# Patient Record
Sex: FEMALE | Race: WHITE | NOT HISPANIC OR LATINO | Employment: PART TIME | URBAN - METROPOLITAN AREA
[De-identification: names, ages, dates, MRNs, and addresses within clinical notes are randomized per-mention and may not be internally consistent; named-entity substitution may affect disease eponyms.]

---

## 2018-01-02 ENCOUNTER — OFFICE VISIT (OUTPATIENT)
Dept: VASCULAR SURGERY | Facility: CLINIC | Age: 63
End: 2018-01-02
Payer: COMMERCIAL

## 2018-01-02 DIAGNOSIS — Z53.9 ERRONEOUS ENCOUNTER--DISREGARD: Primary | ICD-10-CM

## 2018-01-02 PROCEDURE — 99243 OFF/OP CNSLTJ NEW/EST LOW 30: CPT | Performed by: SURGERY

## 2018-01-02 NOTE — MR AVS SNAPSHOT
"              After Visit Summary   2018    Violetta Chavez    MRN: [id number]           Patient Information     Date Of Birth          1955        Visit Information        Provider Department      2018 2:45 PM Denilson Marie [facility]      Today's Diagnoses     ERRONEOUS ENCOUNTER--DISREGARD    -  1       Follow-ups after your visit        Who to contact     If you have questions or need follow up information about today's clinic visit or your schedule please contact [facility] directly at [phone].  Normal or non-critical lab and imaging results will be communicated to you by Tensilicahart, letter or phone within 4 business days after the clinic has received the results. If you do not hear from us within 7 days, please contact the clinic through Tensilicahart or phone. If you have a critical or abnormal lab result, we will notify you by phone as soon as possible.  Submit refill requests through [employer] or call your pharmacy and they will forward the refill request to us. Please allow 3 business days for your refill to be completed.          Additional Information About Your Visit        MyChart Information     [employer] lets you send messages to your doctor, view your test results, renew your prescriptions, schedule appointments and more. To sign up, go to [email or web address] . Click on \"Log in\" on the left side of the screen, which will take you to the Welcome page. Then click on \"Sign up Now\" on the right side of the page.     You will be asked to enter the access code listed below, as well as some personal information. Please follow the directions to create your username and password.     Your access code is: [email or web address]  Expires: 2018  2:36 PM     Your access code will  in 90 days. If you need help or a new code, please call your [place] clinic or [phone].        Care EveryWhere ID     " This is your Care EveryWhere ID. This could be used by other organizations to access your [place] medical records  [id number]         Blood Pressure from Last 3 Encounters:   12/19/11 149/94    Weight from Last 3 Encounters:   12/19/11 68 kg (150 lb)              Today, you had the following     No orders found for display       Primary Care Provider Office Phone # Fax #    Rajiv Camila Faust [phone]       [facility]   [facility] [address]        Equal Access to Services     [facility] Center[facility] : Hadii aad ku hadasho Soomaali, waaxda luqadaha, qaybta kaalmada adeegyada, waxperla denisein hayduyn adeconstantin simpson . So [facility] [phone].    ATENCIÓN: Si habla español, tiene a fernando disposición servicios gratuitos de asistencia lingüística. [facility] [phone].    We comply with applicable federal civil rights laws and [place] laws. We do not discriminate on the basis of race, color, national origin, age, disability, sex, sexual orientation, or gender identity.            Thank you!     Thank you for choosing [facility]  for your care. Our goal is always to provide you with excellent care. Hearing back from our patients is one way we can continue to improve our services. Please take a few minutes to complete the written survey that you may receive in the mail after your visit with us. Thank you!             Your Updated Medication List - Protect others around you: Learn how to safely use, store and throw away your medicines at www.disposemymeds.org.          This list is accurate as of 1/2/18 11:59 PM.  Always use your most recent med list.                   Brand Name Dispense Instructions for use Diagnosis    * acetaminophen 500 MG tablet    TYLENOL    1 tablet    Take 1-2 tablets by mouth 3 times daily.        * acetaminophen 500 MG tablet    TYLENOL     Take 1-2 tablets by mouth every 6 hours as needed.        * ibuprofen 200 MG tablet    ADVIL/MOTRIN    1  tablet    Take 3 tablets by mouth 4 times daily.        * ibuprofen 200 MG capsule      Take 400 mg by mouth every 4 hours as needed.        oxyCODONE IR 5 MG tablet    ROXICODONE    30 tablet    Take  by mouth every 6 hours as needed for pain for 20 doses. Take 1-2 tablets every 6 hours for severe pain.        VICODIN PO      Take  by mouth.        * Notice:  This list has 4 medication(s) that are the same as other medications prescribed for you. Read the directions carefully, and ask your doctor or other care provider to review them with you.

## 2018-01-03 NOTE — PROGRESS NOTES
[facility] Consult[facility]    Violetta Chavez is a 62-year-old female who presents with complaints of bilateral leg pain and varicose veins with a complication of superficial thrombophlebitis of her left leg on December 30, 2017.  She apparently has had a ultrasound of her left lower extremity veins that have confirmed the superficial phlebitis.  She states she had an episode of erythema and tenderness in the recent past just cephalad of the current episode which has slowly improved.    Varicose veins have been present on her legs for some 15 years and have gradually increased in size.  She also complains of chronic tenderness in her lower legs, especially around her ankles.    She has used Aleve to relieve her pain but has not worn support hose to any significant extent.  She has no history of trauma to her legs.    Past medical history  Medical: Hypertension and scoliosis  Surgical: L4 and L5 disc surgery 2003 and umbilical hernia repair    Medicines: Atorvastatin and triamterene    Allergies: NKA    Social history: She is a realtor, is a non-smoker and has less than 1 drink of alcohol per day.  She is the mother of 3 children    12 point review of systems was completed and was reviewed and is negative other than the previously mentioned problems.    Physical exam  General: Pleasant, fit appearing female in no acute distress.  She is 5 feet 7 inches tall  HEENT: Normocephalic, atraumatic.  EOMI.  External ears and nose are normal.  Respiratory: Normal respiratory effort  Cardiovascular: Pulses regular  Musculoskeletal: Gait and station are normal.  The joints of her fingers and toes are without deformity.  Psychiatric: Judgment, insight, mood and affect are normal  Neurologic: Grossly normal  Extremities: She has a few scattered 4-6 mm varicosities on the right medial thigh and leg.  There are no venous stasis changes there is trace edema.  In the left leg, she has 4-6 mm varicosities coursing from the distal  lateral leg down to the left lateral ankle.  There are other scattered 4-6 mm varicosities about the left medial leg.  There is erythema and induration of the distal medial left leg in 2 areas.  The more cephalad area is round and disklike, consistent with lipoma myelosclerosis.  The more distal area is more tubular, erythematous and indurated.  This is consistent with possible superficial thrombophlebitis.There is trace edema.  She has easily palpable dorsalis pedis and posterior tibial pulses bilaterally.    Impression  By history she has superficial thrombophlebitis of her left lower extremity but, on physical exam, this appears consistent with lipodermatosclerosis.  I would recommend that she begin wearing compression hose on a more consistent basis, elevate her legs when possible and that she return for duplex ultrasound of her lower extremity veins to assess for underlying valve incompetence.  She may well have an incompetent  of the left lower extremity that is contributing to the process.    She appeared to understand the discussion and agreed with the plan.    TERRENCE Marie MD

## 2018-02-06 ENCOUNTER — APPOINTMENT (OUTPATIENT)
Dept: VASCULAR SURGERY | Facility: CLINIC | Age: 63
End: 2018-02-06
Payer: COMMERCIAL

## 2018-02-06 ENCOUNTER — OFFICE VISIT (OUTPATIENT)
Dept: VASCULAR SURGERY | Facility: CLINIC | Age: 63
End: 2018-02-06
Payer: COMMERCIAL

## 2018-02-06 DIAGNOSIS — Z53.9 ERRONEOUS ENCOUNTER--DISREGARD: Primary | ICD-10-CM

## 2018-02-06 PROCEDURE — 99213 OFFICE O/P EST LOW 20 MIN: CPT | Performed by: SURGERY

## 2018-02-06 PROCEDURE — 93970 EXTREMITY STUDY: CPT | Performed by: SURGERY

## 2018-02-06 NOTE — MR AVS SNAPSHOT
"              After Visit Summary   2018    Violetta Chavez    MRN: [id number]           Patient Information     Date Of Birth          1955        Visit Information        Provider Department      2018 2:30 PM [facility]ilson [facility]      Today's Diagnoses     ERRONEOUS ENCOUNTER--DISREGARD    -  1       Follow-ups after your visit        Who to contact     If you have questions or need follow up information about today's clinic visit or your schedule please contact [facility] directly at [phone].  Normal or non-critical lab and imaging results will be communicated to you by [employer]art, letter or phone within 4 business days after the clinic has received the results. If you do not hear from us within 7 days, please contact the clinic through [employer] or phone. If you have a critical or abnormal lab result, we will notify you by phone as soon as possible.  Submit refill requests through [employer] or call your pharmacy and they will forward the refill request to us. Please allow 3 business days for your refill to be completed.          Additional Information About Your Visit        MyChart Information     [employer] lets you send messages to your doctor, view your test results, renew your prescriptions, schedule appointments and more. To sign up, go to [email or web address] . Click on \"Log in\" on the left side of the screen, which will take you to the Welcome page. Then click on \"Sign up Now\" on the right side of the page.     You will be asked to enter the access code listed below, as well as some personal information. Please follow the directions to create your username and password.     Your access code is: [email or web address]  Expires: 2018  2:36 PM     Your access code will  in 90 days. If you need help or a new code, please call your [place] clinic or [phone].        Care EveryWhere ID     " This is your Care EveryWhere ID. This could be used by other organizations to access your [place] medical records  [id number]         Blood Pressure from Last 3 Encounters:   12/19/11 149/94    Weight from Last 3 Encounters:   12/19/11 68 kg (150 lb)              Today, you had the following     No orders found for display       Primary Care Provider Office Phone # Fax #    Rajiv Camila Faust [phone]       [facility] [address]  [facility] [address]        Equal Access to Services     [facility] : Hadii aad ku hadasho Soomaali, waaxda luqadaha, qaybta kaalmada adeegyada, waxperla denisein hayduyn adeconstantin simpson . So [facility] [phone].    ATENCIÓN: Si habla español, tiene a fernando disposición servicios gratuitos de asistencia lingüística. [facility] [phone].    We comply with applicable federal civil rights laws and [place] laws. We do not discriminate on the basis of race, color, national origin, age, disability, sex, sexual orientation, or gender identity.            Thank you!     Thank you for choosing [facility]  for your care. Our goal is always to provide you with excellent care. Hearing back from our patients is one way we can continue to improve our services. Please take a few minutes to complete the written survey that you may receive in the mail after your visit with us. Thank you!             Your Updated Medication List - Protect others around you: Learn how to safely use, store and throw away your medicines at www.disposemymeds.org.          This list is accurate as of 2/6/18 11:59 PM.  Always use your most recent med list.                   Brand Name Dispense Instructions for use Diagnosis    * acetaminophen 500 MG tablet    TYLENOL    1 tablet    Take 1-2 tablets by mouth 3 times daily.        * acetaminophen 500 MG tablet    TYLENOL     Take 1-2 tablets by mouth every 6 hours as needed.        * ibuprofen 200 MG tablet    ADVIL/MOTRIN    1  tablet    Take 3 tablets by mouth 4 times daily.        * ibuprofen 200 MG capsule      Take 400 mg by mouth every 4 hours as needed.        oxyCODONE IR 5 MG tablet    ROXICODONE    30 tablet    Take  by mouth every 6 hours as needed for pain for 20 doses. Take 1-2 tablets every 6 hours for severe pain.        VICODIN PO      Take  by mouth.        * Notice:  This list has 4 medication(s) that are the same as other medications prescribed for you. Read the directions carefully, and ask your doctor or other care provider to review them with you.

## 2018-02-12 NOTE — PROGRESS NOTES
[facility]    Violetta Chavez returns in follow-up of bilateral leg pain and varicose veins.  She has had complications of superficial thrombophlebitis of her left lower extremity in the past.  She returned today for duplex ultrasound of her lower extremity veins.    Duplex ultrasound of her right lower extremity deep veins revealed no evidence of deep vein thrombosis or deep vein valve incompetence.  Her right great saphenous vein is incompetent from the proximal thigh through the mid calf and is the source of a 4 mm incompetent vein branch.  The small saphenous vein is competent.  The anterior accessory saphenous vein is not visualized.  There are no incompetent perforators appreciated.  In the left lower extremity is no evidence of deep vein thrombosis or deep vein valve incompetence.  Her left great saphenous vein is incompetent from the saphenofemoral junction to the ankle with incompetent varicosities coursing from the thigh down to the calf.  The left small saphenous vein is incompetent from the distal calf.  The Vein of Giacomini is competent.  Anterior accessory saphenous vein is not visualized.  There are no incompetent perforators appreciated.    Impression  CEAP 3/4 chronic venous insufficiency left lower extremity with CEAP to chronic venous insufficiency right lower extremity secondary to bilateral great saphenous vein incompetence.  We discussed options of continued conservative management with the use of compression hose with small risk of superficial thumb phlebitis, bleeding or worsening of varicose veins.  She is already suffered a complication of superficial phlebitis and certainly is at increased risk for this.    If she chose treatment, she would be a candidate for endovenous ablation of the left great saphenous vein with and without phlebectomies.  Details of radiofrequency ablation and cyanoacrylate glue ablation of the saphenous vein including risks and benefits of each were  discussed.  After discussion, she wishes to have the VenaSeal procedure over radiofrequency ablation.  Details of the procedure including risks of bleeding, infection and the low risk of thrombus extension to the common femoral vein were discussed.  She appears to understand.  She understands that she will likely have residual varicosities if she does not have phlebectomy.  Because she has had pretty superficial phlebitis, her phlebectomies will be considered medically necessary.    She will think about her options and let us know how she like to proceed.    TERRENCE Marie MD

## 2022-09-13 ENCOUNTER — TELEPHONE (OUTPATIENT)
Dept: VASCULAR SURGERY | Facility: CLINIC | Age: 67
End: 2022-09-13

## 2022-09-13 NOTE — TELEPHONE ENCOUNTER
A faxed referral came through for patient to see Dr. Marie. Called and left voicemail message for patient to call back if she is interested in an appointment. She has seen Dr. Marie in the past; 2/6/18 for ultrasound results.

## 2022-09-16 DIAGNOSIS — I83.813 VARICOSE VEINS OF BILATERAL LOWER EXTREMITIES WITH PAIN: Primary | ICD-10-CM

## 2022-10-18 ENCOUNTER — ANCILLARY PROCEDURE (OUTPATIENT)
Dept: ULTRASOUND IMAGING | Facility: CLINIC | Age: 67
End: 2022-10-18
Attending: SURGERY
Payer: COMMERCIAL

## 2022-10-18 DIAGNOSIS — I83.813 VARICOSE VEINS OF BILATERAL LOWER EXTREMITIES WITH PAIN: ICD-10-CM

## 2022-10-18 PROCEDURE — 93970 EXTREMITY STUDY: CPT | Performed by: SURGERY

## 2022-11-29 ENCOUNTER — TELEPHONE (OUTPATIENT)
Dept: VASCULAR SURGERY | Facility: CLINIC | Age: 67
End: 2022-11-29

## 2022-11-29 ENCOUNTER — OFFICE VISIT (OUTPATIENT)
Dept: VASCULAR SURGERY | Facility: CLINIC | Age: 67
End: 2022-11-29
Payer: COMMERCIAL

## 2022-11-29 DIAGNOSIS — I83.813 VARICOSE VEINS OF BILATERAL LOWER EXTREMITIES WITH PAIN: Primary | ICD-10-CM

## 2022-11-29 PROCEDURE — 99214 OFFICE O/P EST MOD 30 MIN: CPT | Performed by: SURGERY

## 2022-11-29 RX ORDER — ATORVASTATIN CALCIUM 10 MG/1
TABLET, FILM COATED ORAL
COMMUNITY
Start: 2022-09-14

## 2022-11-29 RX ORDER — TRIAMTERENE AND HYDROCHLOROTHIAZIDE 37.5; 25 MG/1; MG/1
CAPSULE ORAL
COMMUNITY
Start: 2022-09-14

## 2022-11-29 NOTE — PATIENT INSTRUCTIONS
Varicose Veins and Spider Veins    Varicose veins are swollen, enlarged veins most often found in the legs. They are usually blue or purple in color and may bulge, twist, and stand out under the skin. Spider veins are small veins just under your skin that can look red, blue or purple.        Normally, veins return blood from the body to the heart. The leg veins have one-way valves that prevent blood from flowing backward in the vein. When the valves are weak or damaged, blood backs up in the veins. This may cause some of the veins to swell and bulge and become varicose veins.     Symptoms  Varicose veins may or may not cause symptoms. If symptoms do occur, they can include:  Legs that feel tired, achy, heavy, or itchy  Leg swelling  Leg muscle cramps  Skin changes, such as discoloration, dryness, redness, or rash (in more severe cases, you may also have sores on the skin called venous leg ulcers)    Risk factors  There are a number of factors that increase the risk for varicose veins. These can include:   Being a woman  Being older  Sitting or standing for long periods  Being overweight  Being pregnant  Having a family history of varicose veins  Hormones, birth control pills    Treatment starts with simple self-help measures (see below). If these don't help, there are many procedures that can be done to shrink or remove varicose veins. Your healthcare provider can tell you more about these options, if needed.     Home care  Support or compression stockings will likely be prescribed. If so, be sure to wear them as directed. They may help improve blood flow.  Exercising helps strengthen your leg muscles and improve blood flow. To get the most benefit, choose exercises such as walking, swimming, or cycling. Also try to exercise for at least 30 minutes on most days.  Raising your legs above heart level will help relieve swelling and keep blood from pooling in veins. Try to elevate your legs for 15 to 20 minutes at  the end of the day, and whenever you're relaxing. To make sure your legs are raised above heart level, prop them up on cushions or large pillows.  To keep blood moving when you have to sit or stand for long periods, try these tips:  At work, take walking breaks instead of coffee breaks. Walk during your lunch hour. Or try flexing your feet up and down 10 times each hour.  When standing, raise yourself up and down on your toes, or rock back and forth on your heels.  If you are overweight, talk with your healthcare provider about setting up a weight-loss plan. Maintaining a healthy weight can help reduce the strain on your veins. It may also improve symptoms, such as swelling and aching.  If you have dryness and itching, ask your provider about special lotions that can be applied to the skin to help improve symptoms.     Follow-up care  Follow up with your healthcare provider, or as directed. If imaging tests were done, you'll be told the results and if there are any new findings that affect your care.     When to seek medical advice  Call your healthcare provider right away if any of these occur:  Sudden, severe leg swelling, pain, or redness  Symptoms worsen, or they don't improve with self-care  Bleeding from any affected veins  Ulcers form on the legs, ankles, or feet  Fever of 100.4 F (38 C) or higher, or as advised by your provider    [employer] last reviewed this educational content on 4/1/2018 2000-2021 [employer]. All rights reserved. This information is not intended as a substitute for professional medical care. Always follow your healthcare professional's instructions.    Self-Care for Spider and Varicose Veins    Your healthcare provider may suggest that you try self-care. Exercising and maintaining a healthy weight may keep problem veins from getting worse. Wearing elastic stockings and elevating your legs can help improve blood flow. Taking breaks when you sit or stand helps,  too.        Wearing compression stockings  Compression stockings gently squeeze veins so blood flows upward. If you need compression stockings, your healthcare provider can prescribe them for you. Follow your healthcare provider's advice about how and when to wear them. Compression stockings come in several different levels of pressure. Ask your healthcare provider which level of pressure would help you the most.     Raising your legs above heart level will help relieve swelling and keep blood from pooling in veins. Try to elevate your legs for 15 to 20 minutes at the end of the day, and whenever you're relaxing. To make sure your legs are raised above heart level, prop them up on cushions or large pillows.    To keep blood moving when you have to sit or stand for long periods, try these tips:  At work, take walking breaks instead of coffee breaks. Walk during your lunch hour. Or try flexing your feet up and down 10 times each hour.  When standing, raise yourself up and down on your toes, or rock back and forth on your heels.    [employer] last reviewed this educational content on 11/1/2019 2000-2021 [employer]. All rights reserved. This information is not intended as a substitute for professional medical care. Always follow your healthcare professional's instructions.    Treatment Options    Sclerotherapy  Your healthcare provider will inject the vein with a special chemical that will quickly close the vein from the inside. This is particularly useful for spider veins and smaller varicose veins.      If you have large varicose veins, surgery may be the best choice. But it will not prevent new varicose veins from forming. Surgery is most often done in a surgery center or in the office.    If surgery is recommended for you, your surgery will be tailored to your needs. Varicose veins may be tied off (ligation), destroyed, or removed. Blood will then flow through the healthy veins. One or more of the  following techniques may be used:    Ablation (laser or radiofrequency)  A tiny cut in the skin is made near the varicose vein. A small tube called a catheter is inserted into the vein. Energy or heat released from the catheter tip will make the vein walls collapse and stick together, stopping all blood flow through the vein.         Ablation (glue)  A tiny cut in the skin is made near the varicose vein. A small tube called a catheter is inserted into the vein. Droplets of glue are deposited into the vein to make vein walls collapse and stick together stopping blood flow through the vein.    Microphlebectomy or ambulatory phlebectomy  A special hook is used to gently take out a varicose vein through tiny incisions. Microphlebectomy may be done in your healthcare provider's office.      Vein stripping and ligation (rare)  In more severe cases, the surgeon may tie off and remove veins by making smaller cuts in the skin. Smaller branching veins may also be tied off or removed.    Know about the risks  Your healthcare provider will talk with you about the risks of surgery. These include:  Bleeding or swelling  A sense of numbness, burning, or tingling in areas near the procedure  Edema or swelling in the legs  Clots in the deep veins that may travel to the lungs  Infection  Scarring  Inflammation related to the glue    [employer] last reviewed this educational content on 11/1/2019 (Sclerotherapy image) 12/1/2019 (Radiofrequency ablation image, Microphlebectomy image)    9416-1630 [employer]. All rights reserved. This information is not intended as a substitute for professional medical care. Always follow your healthcare professional's instructions.

## 2022-11-29 NOTE — LETTER
11/29/2022         RE: Violetta Chavez  [address] [place] [address]        Dear Colleague,    Thank you for referring your patient, Violetta Chavez, to the [facility] [place]. Please see a copy of my visit note below.    [facility] [place] Progress Note    Violetta Chavez presents in follow-up of bilateral lower extremity varicose veins with pain.  I saw her last in February 2018 at which time we performed a bilateral lower extremity venous competency study which revealed bilateral great saphenous vein incompetence with varicose veins coursing from the great saphenous veins bilaterally.    She chose to pursue conservative measures at that time and is noted significant worsening of the varicosities, especially on her right lower extremity.  By history, she has had superficial thrombophlebitis of her left lower extremity in the past.  She has not suffered any additional complications since I last saw her.    Symptoms she describes is primarily fatigue which is worse toward the end of the day and improves with elevation of leg and Tylenol or ibuprofen on an as-needed basis.    She has no history of deep vein thrombosis or hemorrhage but, as above, has had superficial thrombophlebitis of her left lower extremity.    Her family history is negative for varicose veins.    Physical Exam  General: Pleasant female in no acute distress.  Blood pressure 152/91, pulse 73  Extremities: Right lower extremity: 6 mm varicosities coursing from the distal medial thigh, down the medial calf and to the distal third of the medial leg.  Trace edema but no stasis changes.    Left lower extremity: 4 mm varicosity on the left medial calf.  There is a 4 mm varicosity coursing from distal third of the lateral left leg down to the lateral ankle.  Trace edema but no stasis hyperpigmentation.    Ultrasound:  Name:  Violetta PAINTING  Aagaard                                                      Patient ID: [id number]  Date: 2022                                                          : 1955  Sex: female                                                                             Examined by: ROCIO Uriarte RVT  Age:  67 year old                                                                     Reading MD: GIACOMO     INDICATION:  Varicose veins of bilateral lower extremities with pain.     EXAM TYPE  BILATERAL LOWER EXTREMITY VENOUS DUPLEX FOR VENOUS INSUFFICIENCY  TECHNICAL SUMMARY     A duplex ultrasound study using color flow was performed to evaluate the bilateral lower extremity veins for valvular incompetence with the patient in a steep reversed trendelenberg.      RIGHT:     The deep veins demonstrate phasic flow, compress, and respond to augmentations.  There is no reflux or DVT.  The common femoral and mid femoral veins are incompetent and free of thrombus. The remaining deep veins are competent and free of thrombus.      The GSV demonstrates phasic flow, compresses, and responds to augmentations from the saphenofemoral junction to the ankle with no evidence of thrombus. The greater saphenous vein measures 5.6 mm at the saphenofemoral junction, 5.2 mm in the proximal thigh, and 2.0 mm at the knee. The GSV is incompetent from the SFJ to the proximal calf with the greatest reflux time of 9123 milliseconds.The GSV gives rise to multiple incompetent varicose veins, the largest measures 5.1 mm off the mid thigh that courses medially to with a reflux time of 2409 milliseconds.      The AASV is competent ( 1.7 mm) draining into the saphenofemoral junction.      The Giacomini vein is competent ( 0.8 mm) communicating with the small saphenous vein at the knee level.      The SSV demonstrates phasic flow, compresses, and responds to augmentations from the popliteal space to the ankle.  No reflux or thrombus is seen. The  saphenopopliteal junction is absent.      Perforators: There is an incompetent  vein ( 7.5 mm) at 5 cm from medial malleolus that communicates with a varicose vein measuring 4.9 mm.      LEFT:     The deep veins demonstrate phasic flow, compress, and respond to augmentations.  There is no evidence of DVT.  The proximal femoral and popliteal veins are incompetent and free of thrombus. The remaining deep veins are competent and free of thrombus.      The GSV demonstrates phasic flow, compresses, and responds to augmentations from the saphenofemoral junction to the ankle with no evidence of thrombus. The greater saphenous vein measures 6.8 mm at the saphenofemoral junction, 6.1 mm in the proximal thigh, and 4.6 mm at the knee.  The GSV is incompetent from the proximal thigh to the distal calf with the greatest reflux time of 10,310 milliseconds. The GSV gives rise to multiple incompetent varicose veins, the largest measures 4.5 mm off the proximal calf that courses medially with a reflux time of 7160 milliseconds.      The AASV is not visualized.      The Giacomini vein is competent ( 0.8 mm) communicating with the small saphenous vein at the knee level.      The SSV demonstrates phasic flow, compresses, and responds to augmentations from the popliteal space to the ankle.  No thrombus is seen. The saphenopopliteal junction is absent. The SSV is incompetent in the distal calf with a reflux time of 1237 milliseconds.       Perforators:  There is an incompetent  vein ( 2.8 mm) at 15 cm from medial malleolus that communicates with a varicose vein measuring 3.2 mm that courses to the lateral ankle.        FINAL SUMMARY:     Incompetence Criteria:     1.  No evidence of deep or superficial vein thrombosis in either lower extremity  2.  Right common femoral and mid femoral vein incompetence  3.  Left proximal femoral and popliteal vein incompetence  4.  Right great saphenous vein incompetence, the source  of incompetent varicosities  5.  Incompetent  distal medial right leg, the source of incompetent varicosities  6.  Left great saphenous vein incompetence, the source of incompetent varicosities  7.  Left distal calf small saphenous vein incompetence  8.  Incompetent  lateral left leg     Greater than 500 milliseconds of reflux time measured in the superficial and perforating veins and greater than 1000 milliseconds of reflux time measured in the deep veins.     TEJA Marie MD, FACS    Assessment:  Progression of bilateral lower extremity chronic venous insufficiency which is now worse/more symptomatic on her right leg than her left.  She has not pursued an adequate course of conservative management with compression hose, therefore we measured and fitted her with knee-high 20-30 mmHg compression.  Exercise, leg elevation and dietary measures were also discussed with her.    The lower extremity venous anatomy, the pathophysiology of venous insufficiency and the option of continued conservative management was discussed.  She is quite concerned about the progression of the disease process since I saw her in 2018, especially on her right lower extremity.    Based on ultrasound findings, the source of her symptoms appear to be from the great saphenous veins bilaterally.  She also has an incompetent perforating vein in the distal lateral left leg which is the source of the varicosity coursing down to the left lateral ankle as above.    We discussed options for treating superficial venous insufficiency in the office setting utilizing endovenous ablation with a thermal or nonthermal techniques.  Benefits and risks of each were discussed.  Management of her branch tributaries could be with either concomitant phlebectomies or delayed sclerotherapy.  She would defer concomitant phlebectomies if treatment is necessary.  She voiced understanding of our discussion and her questions were  answered.    Plan:  Pursue an appropriate course of conservative management with follow-up.  Follow-up could be via a video visit.    Denilson Marie MD    Dictated using Dragon voice recognition software which may result in transcription errors          Again, thank you for allowing me to participate in the care of your patient.        Sincerely,        Denilson Marie MD

## 2022-11-29 NOTE — NURSING NOTE
Patient Reported symptoms:    Bilateral leg   Heaviness A good bit of the time   Achiness Some of the time   Swelling A little of the time   Throbbing None of the time   Itching None of the time   Appearance Moderately noticeable   Impact on work/activities Symptoms but full able to participate

## 2022-11-29 NOTE — PROGRESS NOTES
[facility] [place] Progress Note    Violetta Chavez presents in follow-up of bilateral lower extremity varicose veins with pain.  I saw her last in 2018 at which time we performed a bilateral lower extremity venous competency study which revealed bilateral great saphenous vein incompetence with varicose veins coursing from the great saphenous veins bilaterally.    She chose to pursue conservative measures at that time and is noted significant worsening of the varicosities, especially on her right lower extremity.  By history, she has had superficial thrombophlebitis of her left lower extremity in the past.  She has not suffered any additional complications since I last saw her.    Symptoms she describes is primarily fatigue which is worse toward the end of the day and improves with elevation of leg and Tylenol or ibuprofen on an as-needed basis.    She has no history of deep vein thrombosis or hemorrhage but, as above, has had superficial thrombophlebitis of her left lower extremity.    Her family history is negative for varicose veins.    Physical Exam  General: Pleasant female in no acute distress.  Blood pressure 152/91, pulse 73  Extremities: Right lower extremity: 6 mm varicosities coursing from the distal medial thigh, down the medial calf and to the distal third of the medial leg.  Trace edema but no stasis changes.    Left lower extremity: 4 mm varicosity on the left medial calf.  There is a 4 mm varicosity coursing from distal third of the lateral left leg down to the lateral ankle.  Trace edema but no stasis hyperpigmentation.    Ultrasound:  Name:  Violetta Chavez                                                      Patient ID: [id number]  Date: 2022                                                          : 1955  Sex: female                                                                             Examined by: ROCIO Uriarte RVT  Age:  67 year  old                                                                     Reading MD: GIACOMO     INDICATION:  Varicose veins of bilateral lower extremities with pain.     EXAM TYPE  BILATERAL LOWER EXTREMITY VENOUS DUPLEX FOR VENOUS INSUFFICIENCY  TECHNICAL SUMMARY     A duplex ultrasound study using color flow was performed to evaluate the bilateral lower extremity veins for valvular incompetence with the patient in a steep reversed trendelenberg.      RIGHT:     The deep veins demonstrate phasic flow, compress, and respond to augmentations.  There is no reflux or DVT.  The common femoral and mid femoral veins are incompetent and free of thrombus. The remaining deep veins are competent and free of thrombus.      The GSV demonstrates phasic flow, compresses, and responds to augmentations from the saphenofemoral junction to the ankle with no evidence of thrombus. The greater saphenous vein measures 5.6 mm at the saphenofemoral junction, 5.2 mm in the proximal thigh, and 2.0 mm at the knee. The GSV is incompetent from the SFJ to the proximal calf with the greatest reflux time of 9123 milliseconds.The GSV gives rise to multiple incompetent varicose veins, the largest measures 5.1 mm off the mid thigh that courses medially to with a reflux time of 2409 milliseconds.      The AASV is competent ( 1.7 mm) draining into the saphenofemoral junction.      The Giacomini vein is competent ( 0.8 mm) communicating with the small saphenous vein at the knee level.      The SSV demonstrates phasic flow, compresses, and responds to augmentations from the popliteal space to the ankle.  No reflux or thrombus is seen. The saphenopopliteal junction is absent.      Perforators: There is an incompetent  vein ( 7.5 mm) at 5 cm from medial malleolus that communicates with a varicose vein measuring 4.9 mm.      LEFT:     The deep veins demonstrate phasic flow, compress, and respond to augmentations.  There is no evidence of DVT.  The  proximal femoral and popliteal veins are incompetent and free of thrombus. The remaining deep veins are competent and free of thrombus.      The GSV demonstrates phasic flow, compresses, and responds to augmentations from the saphenofemoral junction to the ankle with no evidence of thrombus. The greater saphenous vein measures 6.8 mm at the saphenofemoral junction, 6.1 mm in the proximal thigh, and 4.6 mm at the knee.  The GSV is incompetent from the proximal thigh to the distal calf with the greatest reflux time of 10,310 milliseconds. The GSV gives rise to multiple incompetent varicose veins, the largest measures 4.5 mm off the proximal calf that courses medially with a reflux time of 7160 milliseconds.      The AASV is not visualized.      The Giacomini vein is competent ( 0.8 mm) communicating with the small saphenous vein at the knee level.      The SSV demonstrates phasic flow, compresses, and responds to augmentations from the popliteal space to the ankle.  No thrombus is seen. The saphenopopliteal junction is absent. The SSV is incompetent in the distal calf with a reflux time of 1237 milliseconds.       Perforators:  There is an incompetent  vein ( 2.8 mm) at 15 cm from medial malleolus that communicates with a varicose vein measuring 3.2 mm that courses to the lateral ankle.        FINAL SUMMARY:     Incompetence Criteria:     1.  No evidence of deep or superficial vein thrombosis in either lower extremity  2.  Right common femoral and mid femoral vein incompetence  3.  Left proximal femoral and popliteal vein incompetence  4.  Right great saphenous vein incompetence, the source of incompetent varicosities  5.  Incompetent  distal medial right leg, the source of incompetent varicosities  6.  Left great saphenous vein incompetence, the source of incompetent varicosities  7.  Left distal calf small saphenous vein incompetence  8.  Incompetent  lateral left leg     Greater than  500 milliseconds of reflux time measured in the superficial and perforating veins and greater than 1000 milliseconds of reflux time measured in the deep veins.     TEJA Marie MD, FACS    Assessment:  Progression of bilateral lower extremity chronic venous insufficiency which is now worse/more symptomatic on her right leg than her left.  She has not pursued an adequate course of conservative management with compression hose, therefore we measured and fitted her with knee-high 20-30 mmHg compression.  Exercise, leg elevation and dietary measures were also discussed with her.    The lower extremity venous anatomy, the pathophysiology of venous insufficiency and the option of continued conservative management was discussed.  She is quite concerned about the progression of the disease process since I saw her in 2018, especially on her right lower extremity.    Based on ultrasound findings, the source of her symptoms appear to be from the great saphenous veins bilaterally.  She also has an incompetent perforating vein in the distal lateral left leg which is the source of the varicosity coursing down to the left lateral ankle as above.    We discussed options for treating superficial venous insufficiency in the office setting utilizing endovenous ablation with a thermal or nonthermal techniques.  Benefits and risks of each were discussed.  Management of her branch tributaries could be with either concomitant phlebectomies or delayed sclerotherapy.  She would prefer concomitant phlebectomies if treatment is necessary.  She voiced understanding of our discussion and her questions were answered.    Plan:  Pursue an appropriate course of conservative management with follow-up.  Follow-up could be via a video visit.    Denilson Marie MD    Dictated using Dragon voice recognition software which may result in transcription errors

## 2023-02-28 ENCOUNTER — VIRTUAL VISIT (OUTPATIENT)
Dept: VASCULAR SURGERY | Facility: CLINIC | Age: 68
End: 2023-02-28
Payer: COMMERCIAL

## 2023-02-28 DIAGNOSIS — I83.813 VARICOSE VEINS OF BILATERAL LOWER EXTREMITIES WITH PAIN: Primary | ICD-10-CM

## 2023-02-28 PROCEDURE — 99213 OFFICE O/P EST LOW 20 MIN: CPT | Mod: VID | Performed by: SURGERY

## 2023-02-28 RX ORDER — MULTIVIT WITH MINERALS/LUTEIN
1000 TABLET ORAL 2 TIMES DAILY
COMMUNITY

## 2023-02-28 NOTE — LETTER
2023         RE: Violetta Chavez  [address] [place] [address]        Dear Colleague,    Thank you for referring your patient, Violetta Chavez, to the [facility] [place]. Please see a copy of my visit note below.    Violetta is a 67 year old who is being evaluated via a billable video visit.      How would you like to obtain your AVS? MyChart  If the video visit is dropped, the invitation should be resent by: Text to cell phone: [phone]  Will anyone else be joining your video visit? No            Video-Visit Details    Type of service:  Video Visit     Originating Location (pt. Location): Home    Distant Location (provider location):  On-site  Platform used for Video Visit: [employer]        2023    [facility] [place] Progress Note    Violetta Chavez presents in follow-up of right greater than left lower extremity varicose veins with pain.  Please see my dictation of 2022 for details.  Since I have last seen her, she has been wearing graded compression hose on a daily basis.  She has noted no improvement in her symptoms of heaviness and fatigue, worse toward the end of the day despite the use of compression.  This causes her to have to stop what she is doing and elevate her legs for relief.  The varicose veins on the right medial leg have become more prominent.    Ultrasound:  Name:  Violetta Chavez                                                      Patient ID: [id number]  Date: 2022                                                          : 1955  Sex: female                                                                             Examined by: ROCIO Uriarte RVT  Age:  67 year old                                                                     Reading MD: GIACOMO     INDICATION:  Varicose veins of bilateral lower extremities with pain.     EXAM TYPE  BILATERAL LOWER EXTREMITY VENOUS DUPLEX FOR VENOUS  INSUFFICIENCY  TECHNICAL SUMMARY     A duplex ultrasound study using color flow was performed to evaluate the bilateral lower extremity veins for valvular incompetence with the patient in a steep reversed trendelenberg.      RIGHT:     The deep veins demonstrate phasic flow, compress, and respond to augmentations.  There is no reflux or DVT.  The common femoral and mid femoral veins are incompetent and free of thrombus. The remaining deep veins are competent and free of thrombus.      The GSV demonstrates phasic flow, compresses, and responds to augmentations from the saphenofemoral junction to the ankle with no evidence of thrombus. The greater saphenous vein measures 5.6 mm at the saphenofemoral junction, 5.2 mm in the proximal thigh, and 2.0 mm at the knee. The GSV is incompetent from the SFJ to the proximal calf with the greatest reflux time of 9123 milliseconds.The GSV gives rise to multiple incompetent varicose veins, the largest measures 5.1 mm off the mid thigh that courses medially to with a reflux time of 2409 milliseconds.      The AASV is competent ( 1.7 mm) draining into the saphenofemoral junction.      The Giacomini vein is competent ( 0.8 mm) communicating with the small saphenous vein at the knee level.      The SSV demonstrates phasic flow, compresses, and responds to augmentations from the popliteal space to the ankle.  No reflux or thrombus is seen. The saphenopopliteal junction is absent.      Perforators: There is an incompetent  vein ( 7.5 mm) at 5 cm from medial malleolus that communicates with a varicose vein measuring 4.9 mm.      LEFT:     The deep veins demonstrate phasic flow, compress, and respond to augmentations.  There is no evidence of DVT.  The proximal femoral and popliteal veins are incompetent and free of thrombus. The remaining deep veins are competent and free of thrombus.      The GSV demonstrates phasic flow, compresses, and responds to augmentations from the  saphenofemoral junction to the ankle with no evidence of thrombus. The greater saphenous vein measures 6.8 mm at the saphenofemoral junction, 6.1 mm in the proximal thigh, and 4.6 mm at the knee.  The GSV is incompetent from the proximal thigh to the distal calf with the greatest reflux time of 10,310 milliseconds. The GSV gives rise to multiple incompetent varicose veins, the largest measures 4.5 mm off the proximal calf that courses medially with a reflux time of 7160 milliseconds.      The AASV is not visualized.      The Giacomini vein is competent ( 0.8 mm) communicating with the small saphenous vein at the knee level.      The SSV demonstrates phasic flow, compresses, and responds to augmentations from the popliteal space to the ankle.  No thrombus is seen. The saphenopopliteal junction is absent. The SSV is incompetent in the distal calf with a reflux time of 1237 milliseconds.       Perforators:  There is an incompetent  vein ( 2.8 mm) at 15 cm from medial malleolus that communicates with a varicose vein measuring 3.2 mm that courses to the lateral ankle.        FINAL SUMMARY:     Incompetence Criteria:     1.  No evidence of deep or superficial vein thrombosis in either lower extremity  2.  Right common femoral and mid femoral vein incompetence  3.  Left proximal femoral and popliteal vein incompetence  4.  Right great saphenous vein incompetence, the source of incompetent varicosities  5.  Incompetent  distal medial right leg, the source of incompetent varicosities  6.  Left great saphenous vein incompetence, the source of incompetent varicosities  7.  Left distal calf small saphenous vein incompetence  8.  Incompetent  lateral left leg     Greater than 500 milliseconds of reflux time measured in the superficial and perforating veins and greater than 1000 milliseconds of reflux time measured in the deep veins.    TEJA Marie MD, FACS    Assessment:  Bilateral great  saphenous vein incompetence with worsening symptoms despite more than 3 months of compression hose.  The symptoms are interfering with her actives of daily living.  Right great saphenous vein is incompetent, measures 5.2 mm in diameter in the proximal thigh with reflux time of 2.4 seconds and is the source of multiple incompetent vein branches measuring up to 5.3 mm in diameter of the thigh great saphenous vein with reflux time of 2.4 seconds.    There is a 7.5 mm diameter incompetent  5 cm cephalad of the right medial ankle communicating with incompetent vein branch.    In her left lower extremity, her great saphenous vein is incompetent, measures 6.1 mm in diameter in the proximal thigh with reflux time of 3.8 seconds and is the source of multiple incompetent vein branches measuring up to 4.5 mm in diameter with reflux time of 7.1 seconds.    The option of continued conservative management as she has pursued was discussed.  Small risks of superficial thrombophlebitis, bleeding and progression of the disease process were discussed.    She is a candidate for endovenous ablation of the right great saphenous vein in the office setting.  Details procedure including risks of deep vein thrombosis, bleeding, infection and nerve injury were discussed.  She inquired about the bulging that she notices on her right medial leg and whether this would dissipate with treatment.  If we simply ablate the great saphenous vein, the bulging varicosities will be smaller but likely will not totally resolved.  She would likely need a adjunct procedure, either phlebectomy or sclerotherapy, to treat these varicosities.  This could be performed either concomitantly with phlebectomies or at a later date with sclerotherapy.    Plan:  Endovenous ablation of the right great saphenous vein with or without phlebectomies or sclerotherapy.    Denilson Marie MD       Vein Procedure Recommendation    Called and left voicemail for  patient.    Dr. Marie has recommended patient to have the following vein procedure(s):     1. Right leg VNUS closure GSV     Patient is recommended to wear Thigh High compression hose following her procedure. Discussed compression hose. Explained to patient if insurance doesn't cover the compression hose there are a couple different options to getting them and to call the clinic to let us know if they need help.    Entered in patient's After Visit Summary (viewable in [employer]) written procedure instructions to review on her own (see After Visit Summary).    Next steps:    Instructed pt to give us a call back if interested in pre-scheduling her procedure.    Insurance Submission  Informed patient this process could take up to 14 business days, but once approved, the patient will be contacted by our surgery scheduler to schedule the above procedure. Gave patient our surgery scheduler's information.    Gave patient our call back number if any further questions or concerns.    Tess Case RN  [facility]  Vein Clinic      Again, thank you for allowing me to participate in the care of your patient.        Sincerely,        Denilson Marie MD

## 2023-02-28 NOTE — PROGRESS NOTES
2023    [facility] [place] Progress Note    Violetta Chavez presents in follow-up of right greater than left lower extremity varicose veins with pain.  Please see my dictation of 2022 for details.  Since I have last seen her, she has been wearing graded compression hose on a daily basis.  She has noted no improvement in her symptoms of heaviness and fatigue, worse toward the end of the day despite the use of compression.  This causes her to have to stop what she is doing and elevate her legs for relief.  The varicose veins on the right medial leg have become more prominent.    Ultrasound:  Name:  Violetta Chavez                                                      Patient ID: [id number]  Date: 2022                                                          : 1955  Sex: female                                                                             Examined by: ROCIO Uriarte RVT  Age:  67 year old                                                                     Reading MD: GIACOMO     INDICATION:  Varicose veins of bilateral lower extremities with pain.     EXAM TYPE  BILATERAL LOWER EXTREMITY VENOUS DUPLEX FOR VENOUS INSUFFICIENCY  TECHNICAL SUMMARY     A duplex ultrasound study using color flow was performed to evaluate the bilateral lower extremity veins for valvular incompetence with the patient in a steep reversed trendelenberg.      RIGHT:     The deep veins demonstrate phasic flow, compress, and respond to augmentations.  There is no reflux or DVT.  The common femoral and mid femoral veins are incompetent and free of thrombus. The remaining deep veins are competent and free of thrombus.      The GSV demonstrates phasic flow, compresses, and responds to augmentations from the saphenofemoral junction to the ankle with no evidence of thrombus. The greater saphenous vein measures 5.6 mm at the saphenofemoral junction, 5.2 mm in the proximal thigh, and  2.0 mm at the knee. The GSV is incompetent from the SFJ to the proximal calf with the greatest reflux time of 9123 milliseconds.The GSV gives rise to multiple incompetent varicose veins, the largest measures 5.1 mm off the mid thigh that courses medially to with a reflux time of 2409 milliseconds.      The AASV is competent ( 1.7 mm) draining into the saphenofemoral junction.      The Giacomini vein is competent ( 0.8 mm) communicating with the small saphenous vein at the knee level.      The SSV demonstrates phasic flow, compresses, and responds to augmentations from the popliteal space to the ankle.  No reflux or thrombus is seen. The saphenopopliteal junction is absent.      Perforators: There is an incompetent  vein ( 7.5 mm) at 5 cm from medial malleolus that communicates with a varicose vein measuring 4.9 mm.      LEFT:     The deep veins demonstrate phasic flow, compress, and respond to augmentations.  There is no evidence of DVT.  The proximal femoral and popliteal veins are incompetent and free of thrombus. The remaining deep veins are competent and free of thrombus.      The GSV demonstrates phasic flow, compresses, and responds to augmentations from the saphenofemoral junction to the ankle with no evidence of thrombus. The greater saphenous vein measures 6.8 mm at the saphenofemoral junction, 6.1 mm in the proximal thigh, and 4.6 mm at the knee.  The GSV is incompetent from the proximal thigh to the distal calf with the greatest reflux time of 10,310 milliseconds. The GSV gives rise to multiple incompetent varicose veins, the largest measures 4.5 mm off the proximal calf that courses medially with a reflux time of 7160 milliseconds.      The AASV is not visualized.      The Giacomini vein is competent ( 0.8 mm) communicating with the small saphenous vein at the knee level.      The SSV demonstrates phasic flow, compresses, and responds to augmentations from the popliteal space to the ankle.  No  thrombus is seen. The saphenopopliteal junction is absent. The SSV is incompetent in the distal calf with a reflux time of 1237 milliseconds.       Perforators:  There is an incompetent  vein ( 2.8 mm) at 15 cm from medial malleolus that communicates with a varicose vein measuring 3.2 mm that courses to the lateral ankle.        FINAL SUMMARY:     Incompetence Criteria:     1.  No evidence of deep or superficial vein thrombosis in either lower extremity  2.  Right common femoral and mid femoral vein incompetence  3.  Left proximal femoral and popliteal vein incompetence  4.  Right great saphenous vein incompetence, the source of incompetent varicosities  5.  Incompetent  distal medial right leg, the source of incompetent varicosities  6.  Left great saphenous vein incompetence, the source of incompetent varicosities  7.  Left distal calf small saphenous vein incompetence  8.  Incompetent  lateral left leg     Greater than 500 milliseconds of reflux time measured in the superficial and perforating veins and greater than 1000 milliseconds of reflux time measured in the deep veins.    TEJA Marie MD, FACS    Assessment:  Bilateral great saphenous vein incompetence with worsening symptoms despite more than 3 months of compression hose.  The symptoms are interfering with her actives of daily living.  Right great saphenous vein is incompetent, measures 5.2 mm in diameter in the proximal thigh with reflux time of 2.4 seconds and is the source of multiple incompetent vein branches measuring up to 5.3 mm in diameter of the thigh great saphenous vein with reflux time of 2.4 seconds.    There is a 7.5 mm diameter incompetent  5 cm cephalad of the right medial ankle communicating with incompetent vein branch.    In her left lower extremity, her great saphenous vein is incompetent, measures 6.1 mm in diameter in the proximal thigh with reflux time of 3.8 seconds and is the source  of multiple incompetent vein branches measuring up to 4.5 mm in diameter with reflux time of 7.1 seconds.    The option of continued conservative management as she has pursued was discussed.  Small risks of superficial thrombophlebitis, bleeding and progression of the disease process were discussed.    She is a candidate for endovenous ablation of the right great saphenous vein in the office setting.  Details procedure including risks of deep vein thrombosis, bleeding, infection and nerve injury were discussed.  She inquired about the bulging that she notices on her right medial leg and whether this would dissipate with treatment.  If we simply ablate the great saphenous vein, the bulging varicosities will be smaller but likely will not totally resolved.  She would likely need a adjunct procedure, either phlebectomy or sclerotherapy, to treat these varicosities.  This could be performed either concomitantly with phlebectomies or at a later date with sclerotherapy.    Plan:  Endovenous ablation of the right great saphenous vein with or without phlebectomies or sclerotherapy.    Denilson Marie MD       Vein Procedure Recommendation    Called and left voicemail for patient.    Dr. Marie has recommended patient to have the following vein procedure(s):     1. Right leg VNUS closure GSV with 10-20 phlebectomies (med nec)    Patient is recommended to wear Thigh High compression hose following her procedure. Discussed compression hose. Explained to patient if insurance doesn't cover the compression hose there are a couple different options to getting them and to call the clinic to let us know if they need help.    Entered in patient's After Visit Summary (viewable in [employer]) written procedure instructions to review on her own (see After Visit Summary).    Next steps:    Instructed pt to give us a call back if interested in pre-scheduling her procedure.    Insurance Submission  Informed patient this process  could take up to 14 business days, but once approved, the patient will be contacted by our surgery scheduler to schedule the above procedure. Gave patient our surgery scheduler's information.    Gave patient our call back number if any further questions or concerns.    Tess Case RN  [facility]  Vein Clinic

## 2023-02-28 NOTE — PROGRESS NOTES
Violetta is a 67 year old who is being evaluated via a billable video visit.      How would you like to obtain your AVS? MyChart  If the video visit is dropped, the invitation should be resent by: Text to cell phone: [phone]  Will anyone else be joining your video visit? No            Video-Visit Details    Type of service:  Video Visit     Originating Location (pt. Location): Home    Distant Location (provider location):  On-site  Platform used for Video Visit: [id number] | [employer]

## 2023-02-28 NOTE — PATIENT INSTRUCTIONS
Pre-Procedure Instructions:                           VNUS Closure and Phlebectomies  You are having a Closure(s) - where one or more veins are closed and Phlebectomies - where one or more veins are removed.    Insurance  Precertification and/or referral authorization may be required by your insurance company.  We will call your insurance company to verify benefits for the medically necessary part of your procedure.    If your insurance does not cover the phlebectomies - it will cost $534.00 for the 10-20 cosmetic phlebectomies    Your Current Medications and Allergies  To reduce bruising, please do not take aspirin-type medications (Motrin, Aleve, Ibuprofen, Advil, etc.) for three days before your procedure. You may take Tylenol if you need a pain reliever.  Are you on blood thinner medications? (Plavix, Coumadin, Eliquis, Xarelto) Please discuss this with your surgeon. You may resume taking your blood thinner medication after your procedure.  Are you sensitive to latex or adhesives used for fake fingernails? Please let us know!    Driving Escort and   Please arrange to have a trusted adult (18 years old or older) drive you to and from the clinic.  For your safety, we recommend you have a trusted adult to stay with you until the next morning.    Your Health  If you have a change in your health before the procedure, contact our office immediately.   (For example: cold symptoms, cough, urinary tract infection, fever, flu symptoms).  A pre-procedure physical is not required.    Note  It is sometimes necessary to adjust the procedure schedule due to emergencies. We greatly appreciate your flexibility and understanding in this matter.  Compression hose are needed following this procedure.                   Check List: The Morning of Your Procedure  ___1. Please do not put anything on your leg(s) or shave the day of your procedure.  ___2. You may take your normal medications the day of your procedure.  ___3.  It is recommended you eat a light breakfast or lunch the day of your procedure.  ___4. Wear comfortable loose-fitting clothing and wide-fitting shoes (i.e. tennis shoes, slip-ons).  ___5. Please arrive at our clinic at the specified time given by the nurse.  ___6. You will sign an affirmation of informed consent.  ___7. Bring your pre-procedure sedation medication (lorazepam and clonidine) with you to the clinic. One hour              before your procedure, you will be instructed to take these medications. The lorazepam (Ativan) lowers              anxiety and sedates you; the clonidine makes the lorazepam more effective. Everyone's body processes              these medications differently. Therefore, reactions to these medications vary. Some people stay awake and              some people sleep through the whole procedure. You may not remember everything about the procedure              or the day. You do not want to make any big decisions for the rest of the day.     The Day of Your Procedure:       VNUS Closure and Phlebectomies  In the Exam Room  A nurse will bring you back to an exam room with your family member or friend. This is when your informed consent will be signed, and you will take your pre-procedure medications.  You will be asked to remove everything from the waist down, including undergarments. You will then put on a hospital gown or shorts and blue booties.  Your surgeon will come in to answer any questions and shanon any bulging varicose veins to be removed.  You will be taken to the restroom to empty your bladder before going into the procedure room.    In the Procedure Room  You will be escorted to the procedure room. You will lie on a procedure table covered with a sheet or blanket.  A nurse will put a blood pressure cuff on your arm and a pulse/oxygen monitor on a finger. Your vital signs will be monitored every 15 minutes.  Your gown will be pulled up slightly and the groin exposed for a short  period of time. The surgeon's assistant will clean your foot, leg, and groin with an antibacterial solution. We will get you covered up as quickly as possible!  Sterile towels and blue drapes will be used to cover you and the table. You will be asked to keep your hands under the blue drapes during the procedure.  The lights will be turned down. The table will be tipped so your head is higher than your feet. You may feel like you're going to slide off, but you won't.    The Procedure  The surgeon will visualize your veins with an ultrasound machine. He or she will then numb your skin and access the vein. A catheter is passed up the vein and positioned with ultrasound guidance. The table will then be tipped head down.  Once the catheter is in the correct position, medication will be injected to numb your leg. You will feel some needle sticks and may feel discomfort as the medication goes in. Once this is done, you should not experience significant discomfort. But if you do, please let us know and more numbing medication can be injected. As the catheter sends out heat, the vein closes off and the catheter is withdrawn.  For the phlebectomy part of the procedure, small incisions are made where the bulging varicose veins have been marked on your leg(s) and these veins will be removed using a small alvarez hook instrument.                    VNUS closure                  Phlebectomy    Post-Procedure  Once the procedure is done, your leg(s) will be washed with warm water and dried. Your leg(s) will be bandaged with large soft dressings and a large ace bandage wrapped from toes to groin.   You will be offered something to drink and a light snack.  You will rest with your leg(s) elevated for approximately 30 minutes. Your friend or family member may join you.  For your safety, you will be taken to your car in a wheelchair. If you are able to, it is good to keep your leg(s) elevated on the car ride home.    Post-Procedure  Instructions:             VNUS Closure and Phlebectomies    Post-Op Day Zero - The Day of Your Procedure:0  1. Medication for Pain Control and Inflammation Control   - The numbing medication injected during your procedure will last for several hours. The pre-procedure                 tablets may make you very sleepy and you might not remember everything from the procedure or from                 the day. This will usually wear off by the next day.   - Ibuprofen:  If tolerated, take ibuprofen (e.g., Advil) to reduce inflammation whether or not you have                 pain. For three days, take two tablets (200 mg each) with every meal and at bedtime with a snack. If                 your pain is not controlled with ibuprofen, you may take prescription pain medication (such as Norco),                 if prescribed.   - You may resume taking any medications you were taking before your procedure.  2. Activity   - Rest with your leg(s) elevated above your heart. This will prevent swelling and bleeding.                 You do not need to elevate your leg(s) while sleeping at night. You may go upstairs, sit up to                 eat, use the bathroom, and take several five-minute walks. Otherwise, keep your leg(s) elevated.                 Minimize the amount of time you are up on your feet to about 30 minutes at a time.  3. Bandages   - The incision sites will be covered with soft bandages and an ACE wrap. Keep your bandages on and       dry for 48 hours. The ACE should provide  snug  compression but should not cause pain or numbness       in the toes. If you have significant discomfort or your toes become cold or numb, unwrap your ACE and       rewrap with less tension starting at the toes wrapping upward.  4. Incisions   - Bleeding: You may see some incision sites that are oozing through the bandages. This is not unusual       and can be managed with Rest, Ice, Compression and Elevation (RICE). Apply ice and firm  pressure       directly to the site that is bleeding and rest with your leg(s) elevated above your heart for 20-30 minutes.    Post-Op Day One:  1. Medication   - Ibuprofen: Continue the same as the Day of Your Procedure. If your pain is not controlled with       ibuprofen, you may take prescription pain medication (such as Norco), if prescribed.   2. Activity   - We would like you to get up at least six times and walk around for short periods of time, unless it is       causing you pain. You should not be on your feet more than 90 minutes at a time. Elevate your leg       above your heart when you are not walking.  3. Bandages   - Your bandages must be kept on and dry for 48 hours.  4. Driving   - You may resume driving when you can do so safely. Do not drive if you are taking narcotic pain       medication.    Post-Op Day Two:   1. Medication  - Ibuprofen: Continue the same as the Day of Your Procedure.  2.  Activity  - Walk as tolerated. Elevate as much as possible when not walking.  3. Bandages and Compression  - Remove ACE wrap and padding. Shower and put on your compression hose during waking hours only for at least five days.    (Your doctor may instruct you to keep your bandages on until your return appointment; please follow your doctor's instructions.)  4. Incisions  - Your leg(s) will be bruised; there may be swelling, hard knots under the skin and possibly some numbness. These will likely resolve over time.   If you see  hair-like  strings coming out of your incisions, do not pull them (this will only cause pain/discomfort). We will trim them when you come back for your follow-up appointment.  5. Call Us If:   - You see any areas on your leg that are red and angry in appearance.   - You notice any drainage that is milky or cloudy in appearance or has a foul odor.   - You run a temperature of 100.5 or greater.    Post-Op Day Three:  You will have a follow up appointment 2-4 days post-procedure. At this  appointment, you will have an ultrasound and we will check your incisions.    __________________________________________________________________________________________    The Two Weeks Following Your Procedure  1.  Skin Care   - Do not use any lotions, creams or powders on your incision(s) for 14 days or until the incisions have               healed.   - Do not soak in a bathtub, hot tub or go swimming for 14 days or until your incisions have healed.  2.  Medications   - You may use ibuprofen or acetaminophen (e.g., Tylenol) as needed for pain or discomfort.  3.  Activity   - Do not lift over 25 pounds. After about two weeks you may resume exercise such as aerobics, running,               tennis or weightlifting. Use your common sense and ease back into your exercise routine slowly.   - You may feel a cord-like tightness along the inside of your leg. Gentle stretching can be helpful.  4. Compression Hose   - Your doctor may instruct you to wear compression for longer than seven days; please    follow your doctor's instructions. As a comfort measure, you may choose to wear compression for    longer than required.  5.  Travel   - Do not fly in an airplane for 14 days after your procedure. If you have a long car trip planned within    two to three weeks following your procedure, stop and walk for a few minutes every two hours.    Periodic ankle pumps during the ride may be helpful.    Six Week Appointment  - At your six-week appointment, you will see your surgeon for an exam and evaluation. This office visit   will be scheduled when you return for post-op day three return appointment.     Return to Work  1.  If you work outside the home, you may return to work in a few days depending on the extent of your        procedure, how you tolerate it, and the type of work you perform.  2.  Paperwork: If your employer requires paperwork or you would like a letter written to your employer, please        let us know. We will  complete disability type forms at no charge. Please allow five business days for forms        to be completed.      [employer] last reviewed this educational content on 11/1/2019 (RFA photo), 12/1/2019 (Phlebectomy photo)    7150-2827 [employer]. All rights reserved. This information is not intended as a substitute for professional medical care. Always follow your healthcare professional's instructions.

## 2023-03-01 ENCOUNTER — TELEPHONE (OUTPATIENT)
Dept: VASCULAR SURGERY | Facility: CLINIC | Age: 68
End: 2023-03-01
Payer: COMMERCIAL

## 2023-03-01 NOTE — TELEPHONE ENCOUNTER
Pt had a virtual visit yesterday (Tuesday 2/28) with Dr. Marie. Pt called earlier this morning regarding her planned vein procedure (Right leg VNUS closure of GSV). Pt questioned whether this procedure would address the bulging varicose veins to her right medial lower leg. As discussed back in November 2022 at her last visit, pt agreed she would want to do concomitant phlebectomies.    Discussed with Dr. Marie, he agreed he would do 10-20 phlebs (med [facility]) if pt wanted to have these done at the same time. Otherwise the other option was sclerotherapy at a later date.     Called pt back and [facility] regarding this information. Instructed pt to call us back ASAP to discuss phlebectomies and make sure we submit the pt's preferred procedure plan to her insurance.    Tess Case RN  [facility]  Vein Clinic

## 2023-03-02 NOTE — TELEPHONE ENCOUNTER
Pt called back. Discussed phlebectomies with her. Reviewed over the post op instructions and updated her procedure instructions in her AVS from her virtual visit on 2/28/23 with Dr. Marie.     Pt is in agreement with doing the Right Leg VNUS Closure of GSV with 10-20 phlebectomies (med [facility]). Informed pt we will submit to her insurance and if they deny the phlebectomies, it will be $534.00 she would have to pay for those. Informed pt Jerri CHAIREZ, surgery scheduler, will submit and it can take up to 14 days to hear back from her insurance, but once she does, Jerri will reach out to her to schedule her procedure.    Pt in agreement with plan and had no further questions.    Tess Case RN  [facility]  Vein Clinic

## 2023-05-09 DIAGNOSIS — I83.813 VARICOSE VEINS OF BILATERAL LOWER EXTREMITIES WITH PAIN: Primary | ICD-10-CM

## 2023-10-18 DIAGNOSIS — I83.813 VARICOSE VEINS OF BILATERAL LOWER EXTREMITIES WITH PAIN: Primary | ICD-10-CM

## 2023-10-18 RX ORDER — LORAZEPAM 1 MG/1
TABLET ORAL
Qty: 1 TABLET | Refills: 0 | Status: SHIPPED | OUTPATIENT
Start: 2023-10-18 | End: 2023-10-26

## 2023-10-18 RX ORDER — CLONIDINE HYDROCHLORIDE 0.1 MG/1
TABLET ORAL
Qty: 1 TABLET | Refills: 0 | Status: SHIPPED | OUTPATIENT
Start: 2023-10-18 | End: 2023-10-26

## 2023-10-24 ENCOUNTER — OFFICE VISIT (OUTPATIENT)
Dept: VASCULAR SURGERY | Facility: CLINIC | Age: 68
End: 2023-10-24
Payer: COMMERCIAL

## 2023-10-24 VITALS — SYSTOLIC BLOOD PRESSURE: 98 MMHG | DIASTOLIC BLOOD PRESSURE: 63 MMHG | HEART RATE: 69 BPM | OXYGEN SATURATION: 95 %

## 2023-10-24 DIAGNOSIS — I83.813 VARICOSE VEINS OF BILATERAL LOWER EXTREMITIES WITH PAIN: Primary | ICD-10-CM

## 2023-10-24 DIAGNOSIS — I83.811 VARICOSE VEINS OF RIGHT LOWER EXTREMITY WITH PAIN: Primary | ICD-10-CM

## 2023-10-24 PROCEDURE — 36475 ENDOVENOUS RF 1ST VEIN: CPT | Mod: RT | Performed by: SURGERY

## 2023-10-24 PROCEDURE — 37765 STAB PHLEB VEINS XTR 10-20: CPT | Mod: RT | Performed by: SURGERY

## 2023-10-24 NOTE — PROGRESS NOTES
Pre-procedure Nursing Note    Violetta Chavez presents to clinic for Vein Procedure  .   /Person Responsible for Patient: Ramses (Spouse)  Phone Number: [phone]    Prophylactic Medication:N/A   Sedation Medication: Ativan, 1mg ,   Time Taken: 1200 and Clonidine, 0.1mg,   Time Taken: 1200  Compression Stockings: Patient brought with today.  The procedure is being performed on RLE.  Patient understanding of procedure matches consent? YES    Patient's pre-procedure medications verified by AF.    Jerri Cruz RN on 10/24/2023 at 12:11 PM

## 2023-10-24 NOTE — LETTER
10/24/2023         RE: Violetta Chavez  [address] [place] [address]        Dear Colleague,    Thank you for referring your patient, Violetta Chavez, to the [facility] [place]. Please see a copy of my visit note below.    Pre-procedure Nursing Note    Violetta Chavez presents to clinic for Vein Procedure  .   /Person Responsible for Patient: Ramses (Spouse)  Phone Number: [phone]    Prophylactic Medication:N/A   Sedation Medication: Ativan, 1mg ,   Time Taken: 1200 and Clonidine, 0.1mg,   Time Taken: 1200  Compression Stockings: Patient brought with today.  The procedure is being performed on RLE.  Patient understanding of procedure matches consent? YES    Patient's pre-procedure medications verified by AF.    Jerri Cruz RN on 10/24/2023 at 12:11 PM        Vein Clinic Procedure Note    Indications:  Right leg varicose veins with pain; symptoms recalcitrant to conservative measures    Procedure:  Radiofrequency ablation right great saphenous vein  Medically necessary phlebectomies right lower extremity (18 stabs)    Surgeon  TERRENCE Marie MD    Procedure Description  Details of the procedure including risks of bleeding, infection, nerve injury, scarring, hyperpigmentation, deep vein thrombosis, recanalization of the right great saphenous vein and recurrent varicose veins all discussed.  The patient voiced understanding and wished to proceed.  Informed consent was obtained.     I had the patient stand and marked varicosities with an indelible marker.  We then proceeded the operating room, had the patient lie supine on the operating table, then prepped and draped the right lower extremity sterilely.    We took a timeout to confirm the appropriate operative site and procedure: Radiofrequency ablation of the right great saphenous vein with medically necessary phlebectomies    VNUS Closure  I imaged the right lower extremity easily identifying the right great saphenous vein.   I infiltrated the skin overlying the vein approximately 8 cm below the knee, distal to the last major varicosity,, placed a micropuncture needle into the vein followed by a micropuncture guidewire and a 7 [place] sheath.    We passed the closure fast device through the sheath, positioning of the tip of the device 2.01 centimeters from the saphenofemoral junction under ultrasound guidance with the operating table in Trendelenburg position.  Tumescent anesthetic was injected along the course of the vein under ultrasound guidance with care to confirm catheter tip position prior to infiltrating the tissues in this location.  The same tumescent anesthetic was also injected around each of the marked varicosities.    After allowing the block to take effect and after confirming appropriate position, I applied compression to the proximal thigh great saphenous vein with the ultrasound probe and additional width 2 fingerbreadths treating the first three 7 cm increments of the vein with 2 RF sessions each.  The remaining vein was treated with 1 RF session to each 7cm increment with the exception of segments of vein where energy readings were higher requiring additional treatments.  We treated down to approximate centimeters below the knee.  The patient was comfortable throughout.    After completing the pullback, I reimaged the vein and the vein to be non-compressible and closed.  The saphenofemoral junction and common femoral veins were fully compressible and free of thrombus. The sheath and the catheter were removed and hemostasis secured with pressure.    Phlebectomies  We made stab wounds beside each of the marked varicosities with 11 scalpel, retrieved the veins with either vein hooks or alvarez hooks, clamped them with mosquito clamps and avulsed them.  Hemostasis was secured with pressure.    After completing the phlebectomies, the leg was cleaned with saline solution and petroleum jelly was applied to the skin.  The leg  was then dressed with ABD pads, cast padding and an Ace bandage from the toes to the groin.   We observed the patient for 30 minutes to ensure excellent hemostasis then took the patient to their ride in a wheelchair.  Post procedure instructions were given to the patient and her  in verbal and written form.  They both voiced understanding and their questions were answered.    The patient tolerated the procedure well without evidence of allergic reaction or other complications and will return in 72 hours for a right lower extremity venous ultrasound.    [place] Closure    Date/Time: 10/29/2023 7:52 AM    Performed by: Denilson Marie MD  Authorized by: Denilson Marie MD    Time out: Immediately prior to the procedure a time out was called    Preparation: Patient was prepped and draped in usual sterile fashion    1st Assist:  Sheron Aviles CST/FARA    Circulator:  Jerri Cruz RN    Procedure:  VNUS  Procedure side:  Right  One Vein    Vein Treated:  GSV  Patient tolerance:  Patient tolerated the procedure well with no immediate complications  Wrap/Hose:  Wraps  Phlebectomy    Date/Time: 10/29/2023 7:53 AM    Performed by: Denilson Marie MD  Authorized by: Denilson Marie MD    Procedure:  Phlebectomies  Type:  Medically Necessary  Procedure side:  Right  Stabs:  10-20  Patient tolerance:  Patient tolerated the procedure well with no immediate complications  Wrap/Hose:  Wraps          10/24/2023     1:19 PM   Flowsheet Data   Procedure Start Time: 13:19   Prep: Chloraprep   Side: Right   Tx Length (cm): RIGHT GSV: 58   Junction (cm): RIGHT GSV: 2.01   RF Cycles: RIGHT GSV: 11   RF TX Time (Minutes): RIGHT GSV: 3:40   # PHLEB Sites: RIGHT: 18   Sedation taken: Yes   Pre Pt. Physical / Cognitive Limitations: WNL   TOTAL Local anesthesia Injected (ml): 3   Max Volume Local Anesthesia (ml): 11   TOTAL Tumescent Injected volume (ml): 150   Max Volume Tumescent (ml):  572   Post Pt. Physical / Cognitive Limitations: WNL   Procedure End Time: 14:05   D/C Instructions given, states readiness to leave and escorted to car: Yes       C Aurelio Marie MD    Dictated using Dragon voice recognition software which may result in transcription errors      Again, thank you for allowing me to participate in the care of your patient.        Sincerely,        Denilson Marie MD   Yes

## 2023-10-26 ENCOUNTER — ALLIED HEALTH/NURSE VISIT (OUTPATIENT)
Dept: VASCULAR SURGERY | Facility: CLINIC | Age: 68
End: 2023-10-26
Attending: SURGERY
Payer: COMMERCIAL

## 2023-10-26 ENCOUNTER — ANCILLARY PROCEDURE (OUTPATIENT)
Dept: ULTRASOUND IMAGING | Facility: CLINIC | Age: 68
End: 2023-10-26
Attending: SURGERY
Payer: COMMERCIAL

## 2023-10-26 DIAGNOSIS — I83.813 VARICOSE VEINS OF BILATERAL LOWER EXTREMITIES WITH PAIN: ICD-10-CM

## 2023-10-26 DIAGNOSIS — I83.813 VARICOSE VEINS OF BILATERAL LOWER EXTREMITIES WITH PAIN: Primary | ICD-10-CM

## 2023-10-26 PROCEDURE — 99207 PR NO CHARGE NURSE ONLY: CPT

## 2023-10-26 PROCEDURE — 93970 EXTREMITY STUDY: CPT | Performed by: SURGERY

## 2023-10-26 NOTE — PROGRESS NOTES
October 26, 2023    Vein Clinic Postoperative Nurse Note    Patient is here for her 48 hour postoperative visit.    Procedure: Right leg VNUS closure GSV(med nec), 10-20 stab phlebs(med nec) on 10/24/23 w/ CPN   Procedure Date: 10/24/23  Surgeon: Dr. Marie    Ultrasound Result: Negative RLE DVT. The right GSV is closed 9.3 mm from the SFJ to the proximal calf.    Physical Exam: Incisions are approximated without signs of infection.  Ecchymosis: moderate  Swelling: minimal  Paresthesia: denies numbness    Patient Questions or Concerns: Overall patient reports doing well. No further questions at this time.    Reviewed postoperative instructions with patient and provided her with written material of common things to expect from her procedure.    Patient's Next Vein Clinic Appointment: 6 week post op 12/19/23    Mee Johnson RN  [facility]

## 2023-10-29 NOTE — PROGRESS NOTES
Vein Clinic Procedure Note    Indications:  Right leg varicose veins with pain; symptoms recalcitrant to conservative measures    Procedure:  Radiofrequency ablation right great saphenous vein  Medically necessary phlebectomies right lower extremity (18 stabs)    Surgeon  TERRENCE Marie MD    Procedure Description  Details of the procedure including risks of bleeding, infection, nerve injury, scarring, hyperpigmentation, deep vein thrombosis, recanalization of the right great saphenous vein and recurrent varicose veins all discussed.  The patient voiced understanding and wished to proceed.  Informed consent was obtained.     I had the patient stand and marked varicosities with an indelible marker.  We then proceeded the operating room, had the patient lie supine on the operating table, then prepped and draped the right lower extremity sterilely.    We took a timeout to confirm the appropriate operative site and procedure: Radiofrequency ablation of the right great saphenous vein with medically necessary phlebectomies    VNUS Closure  I imaged the right lower extremity easily identifying the right great saphenous vein.  I infiltrated the skin overlying the vein approximately 8 cm below the knee, distal to the last major varicosity,, placed a micropuncture needle into the vein followed by a micropuncture guidewire and a 7 [place]awk sheath.    We passed the closure fast device through the sheath, positioning of the tip of the device 2.01 centimeters from the saphenofemoral junction under ultrasound guidance with the operating table in Trendelenburg position.  Tumescent anesthetic was injected along the course of the vein under ultrasound guidance with care to confirm catheter tip position prior to infiltrating the tissues in this location.  The same tumescent anesthetic was also injected around each of the marked varicosities.    After allowing the block to take effect and after confirming appropriate position, I  applied compression to the proximal thigh great saphenous vein with the ultrasound probe and additional width 2 fingerbreadths treating the first three 7 cm increments of the vein with 2 RF sessions each.  The remaining vein was treated with 1 RF session to each 7cm increment with the exception of segments of vein where energy readings were higher requiring additional treatments.  We treated down to approximate centimeters below the knee.  The patient was comfortable throughout.    After completing the pullback, I reimaged the vein and the vein to be non-compressible and closed.  The saphenofemoral junction and common femoral veins were fully compressible and free of thrombus. The sheath and the catheter were removed and hemostasis secured with pressure.    Phlebectomies  We made stab wounds beside each of the marked varicosities with 11 scalpel, retrieved the veins with either vein hooks or alvarez hooks, clamped them with mosquito clamps and avulsed them.  Hemostasis was secured with pressure.    After completing the phlebectomies, the leg was cleaned with saline solution and petroleum jelly was applied to the skin.  The leg was then dressed with ABD pads, cast padding and an Ace bandage from the toes to the groin.   We observed the patient for 30 minutes to ensure excellent hemostasis then took the patient to their ride in a wheelchair.  Post procedure instructions were given to the patient and her  in verbal and written form.  They both voiced understanding and their questions were answered.    The patient tolerated the procedure well without evidence of allergic reaction or other complications and will return in 72 hours for a right lower extremity venous ultrasound.    [place] Closure    Date/Time: 10/29/2023 7:52 AM    Performed by: Denilson Marie MD  Authorized by: Denilson Marie MD    Time out: Immediately prior to the procedure a time out was called    Preparation: Patient was  prepped and draped in usual sterile fashion    1st Assist:  Sheron Aviles CST/CSFA    Circulator:  Jerri Cruz RN    Procedure:  VNUS  Procedure side:  Right  One Vein    Vein Treated:  GSV  Patient tolerance:  Patient tolerated the procedure well with no immediate complications  Wrap/Hose:  Wraps  Phlebectomy    Date/Time: 10/29/2023 7:53 AM    Performed by: Denilson Marie MD  Authorized by: Denilson Marie MD    Procedure:  Phlebectomies  Type:  Medically Necessary  Procedure side:  Right  Stabs:  10-20  Patient tolerance:  Patient tolerated the procedure well with no immediate complications  Wrap/Hose:  Wraps          10/24/2023     1:19 PM   Flowsheet Data   Procedure Start Time: 13:19   Prep: Chloraprep   Side: Right   Tx Length (cm): RIGHT GSV: 58   Junction (cm): RIGHT GSV: 2.01   RF Cycles: RIGHT GSV: 11   RF TX Time (Minutes): RIGHT GSV: 3:40   # PHLEB Sites: RIGHT: 18   Sedation taken: Yes   Pre Pt. Physical / Cognitive Limitations: WNL   TOTAL Local anesthesia Injected (ml): 3   Max Volume Local Anesthesia (ml): 11   TOTAL Tumescent Injected volume (ml): 150   Max Volume Tumescent (ml): 572   Post Pt. Physical / Cognitive Limitations: WNL   Procedure End Time: 14:05   D/C Instructions given, states readiness to leave and escorted to car: Yes       TEJA Marie MD    Dictated using Dragon voice recognition software which may result in transcription errors

## 2023-11-04 ENCOUNTER — HEALTH MAINTENANCE LETTER (OUTPATIENT)
Age: 68
End: 2023-11-04

## 2023-12-19 ENCOUNTER — OFFICE VISIT (OUTPATIENT)
Dept: VASCULAR SURGERY | Facility: CLINIC | Age: 68
End: 2023-12-19
Payer: COMMERCIAL

## 2023-12-19 DIAGNOSIS — I83.811 VARICOSE VEINS OF RIGHT LOWER EXTREMITY WITH PAIN: ICD-10-CM

## 2023-12-19 DIAGNOSIS — I83.812 VARICOSE VEINS OF LEFT LOWER EXTREMITY WITH PAIN: Primary | ICD-10-CM

## 2023-12-19 PROCEDURE — 99213 OFFICE O/P EST LOW 20 MIN: CPT | Performed by: SURGERY

## 2023-12-19 NOTE — PATIENT INSTRUCTIONS
Pre-Procedure Instructions:                 VenaSeal and Phlebectomies  You are having a VenaSeal procedure - where one or more veins are closed and Phlebectomies - where one or more veins are removed.   Insurance  Precertification and/or referral authorization may be required by your insurance company.  We will call your insurance company to verify benefits for the medically necessary part of your procedure.    Your Current Medications and Allergies  To reduce bruising, please do not take aspirin-type medications (Motrin, Aleve, Ibuprofen, Advil, etc.) for three days before your procedure. You may take Tylenol if you need a pain reliever.  Are you on blood thinner medications?  (Plavix, Coumadin, Eliquis, Xarelto)  Please discuss this with your surgeon. You may resume taking your blood thinner medication after your procedure.  Are you sensitive to latex or adhesives used for fake fingernails? Please let us know!    Driving Escort and   Please arrange to have a trusted adult (18 years old or older) drive you to and from the clinic.  For your safety, we recommend you have a trusted adult to stay with you until the next morning.    Your Health  If you have a change in your health before the procedure, contact our office immediately. (For example: cold symptoms, cough, urinary tract infection, fever, flu symptoms.)  A pre-procedure physical is not required.     Note  It is sometimes necessary to adjust the procedure schedule due to emergencies. We greatly appreciate your flexibility and understanding in this matter.                _____    Check List:  The Morning of Your Procedure  ___1.  Please do not put anything on your leg(s) or shave the day of your procedure.  ___2.  You may take your normal medications the day of your procedure  ___3.  It is recommended you eat a light breakfast or lunch the day of your procedure.  ___4.  Wear comfortable loose-fitting clothing and wide-fitting shoes (i.e. tennis  shoes, slip-ons).  ___5.  Please arrive at our clinic at the specified time given by the nurse.  ___6.  You will sign an affirmation of informed consent.  ___7.  Bring your pre-procedure sedation medication (lorazepam and clonidine) with you to the clinic.              One hour before your procedure, you will be instructed to take these medications. The lorazepam              (Ativan) lowers anxiety and sedates you; the clonidine makes the lorazepam more effective. Everyone's               body processes these medications differently. Therefore, reactions to these medications vary. Some               people stay awake and some people sleep through the whole procedure. You may not remember    everything about the procedure or the day. You do not want to make any big decisions for the rest of               the day.    The Day of Your Procedure:                 VenaSeal and Phlebectomies  In the Exam Room  A nurse will bring you back to an exam room with your family member or friend. This is when your informed consent will be signed, and you will take your pre-procedure medications.  You will be asked to remove everything from the waist down, including undergarments. You will then put on a hospital gown or shorts and blue booties.  Your surgeon will come in to answer any questions and to shanon any bulging varicose veins to be removed.  You will be taken to the restroom to empty your bladder before going into the procedure room.    In the Procedure Room  You will be escorted to the procedure room. You will lie on a procedure table covered with a sheet or blanket.  A nurse will put a blood pressure cuff on your arm and a pulse/oxygen monitor on a finger. Your vital signs will be monitored every 15 minutes.  Your gown will be pulled up slightly and the groin exposed for a short period of time. The surgeon's assistant will clean your foot, leg, and groin with an antibacterial solution. We will get you covered up as quickly  as possible!  Sterile towels and drapes will be used to cover you and the table. You will be asked to keep your hands under the drapes during the procedure.  The lights will be turned down. The table will be tipped so sometimes your head is higher than your feet. You may feel like you're going to slide off, but you won't.    The Procedure  The surgeon will visualize your veins with an ultrasound machine. He or she will then numb your skin and access the vein. A catheter is passed up the vein and positioned with ultrasound guidance. The table will then be tipped head down.   Once the catheter is in the correct position, droplets of glue are deposited into the vein to make the vein walls collapse and stick together stopping blood flow through the vein. These droplets of glue are deposited in segments, closing each segment as the catheter is withdrawn.  For the phlebectomy part of the procedure, small incisions are made where the bulging varicose veins have been marked on your leg(s) and these veins will be removed using a small alvarez hook instrument.    Post-Procedure  Once the procedure is done, your leg(s) will be washed with warm water and dried. Your leg(s) will be bandaged with large soft dressings and a large ACE bandage wrapped from toes to groin.   You will be offered something to drink and a light snack.  You will rest with your leg(s) elevated for approximately 30 minutes. Your friend or family member may join you.  For your safety, you will be taken to your car in a wheelchair. If you are able to, it is good to keep your leg(s) elevated on the car ride home.    Post-Procedure Instructions:                                                                         Venaseal and Phlebectomies     Post-Op Day Zero - The Day of Your Procedure:0  1.  Medication for Pain Control and Inflammation Control   - The numbing medication injected during your procedure will last for several hours. The pre-procedure                  tablets may make you very sleepy and you might not remember everything from the procedure or from                 the day. This will usually wear off by the next day.   - Ibuprofen: If tolerated, take ibuprofen to reduce inflammation whether or not you have pain. For                 three days, take 2 tablets (200mg each) with every meal and at bedtime with a snack. If your pain is                 not controlled with ibuprofen, you may take prescription pain medication (such as Norco), if                 prescribed.   - Aspirin: Take one 325mg aspirin to reduce blood clot risk.   - You may resume taking any medications you were taking before your procedure.   2.  Activity   - Rest with your leg(s) elevated above your heart. This will prevent from swelling and bleeding. You                 do not need to elevate your leg(s) while sleeping at night. You may go upstairs, sit up to eat, use the                 bathroom, and take several five-minute walks. Otherwise, keep your leg(s) elevated. Minimize the                 amount of time you are up on your feet to about 30 minutes at a time.   3.  Bandages   - The incision sites will be covered with soft bandages and an ACE wrap. Keep your bandages on                 and dry for 48 hours. The ACE should provide  snug  compression but should not cause pain or                 numbness in the toes. If you have significant discomfort or your toes become cold or numb, unwrap                 your ACE and rewrap with less tension starting at the toes wrapping upward.   4.  Incisions   - Bleeding: You may see some incision sites that are oozing through the bandages. This is not unusual                 and can be managed with Rest, Ice, Compression and Elevation (RICE). Apply ice and firm pressure                 directly to the site that is bleeding and rest with your leg(s) elevated above your heart for 20-30                 minutes.      Post-Op Day One:   1.  Medication    - Ibuprofen and Aspirin: Continue the same as the Day of Your Procedure.   2.  Activity   - We would like you to get up at least six times and walk around for short periods of time unless it is                 causing you pain. You should be on your feet no more than 90 minutes at a time. Elevate your leg                 above your heart when you are not walking.   3.  Bandages   - Your bandages must be kept on and dry for 48 hours.   4.  Driving   - You may resume driving when you can do so safely. Do not drive if you are taking narcotic pain                 medication.    Post-Op Day Two:     1. Medication   - Ibuprofen and Aspirin: Continue the same as the Day of Your Procedure.   2. Activity   - Walk as tolerated. Elevate as much as possible when not walking.   3. Bandages    - Remove ACE wrap and padding. You may shower. Your doctor may request you wear a stocking                 during the day.   4. Incisions   - Your leg(s) will be bruised; there may be swelling, hard knots under the skin and possibly some                 numbness. These will likely resolve over time. If you see  hair-like  strings coming out of your                 incisions, do not pull them (this will only cause pain/discomfort). We will trim them when you come                 back for your follow-up appointment.   5. Call Us If:   - You see any areas on your leg that are red and angry in appearance.   - You notice any drainage that is milky or cloudy in appearance or that has a foul odor.   - You run a temperature of 100.5 or greater.      Post-Op Day Three: You will have a follow up appointment 2-4 days post procedure. At this appointment, you will have an ultrasound and we will check your incisions.  ________________________________________________________________________________________    The Two Weeks Following Your Procedure   1.  Skin Care   - Do not use any lotions, creams, or powders on your incisions for 14 days or until the  incisions have                 healed.   - Do not soak in a bathtub, hot tub or go swimming for 14 days or until your incisions have healed.   2. Medications   - You may use ibuprofen or Tylenol as needed for pain or discomfort.   3. Activity   - Do not lift over 25 pounds. After about ten days you may resume exercise such as aerobics, running,                 tennis or weightlifting. Use your common sense and ease back into your exercise routine slowly.   - You may feel a cord-like tightness along the inside of your leg. Gentle stretching can be helpful.  4. Travel  - Do not fly in an airplane for 14 days after your procedure. If you have a long car trip planned within                 two to three weeks following your procedure, stop and walk for a few minutes every two hours.                 Periodic ankle pumps during the ride may be helpful.     Six Week Appointment   - At your six-week appointment, you will see your surgeon for an exam and evaluation. This office visit                 will be scheduled when you return for post-op day three return appointment.       Return to Work   1.  If you work outside the home, you may return to work in a few days depending on the extent of your procedure, how you tolerate it, and the type of work you perform.   2.  Paperwork: If your employer requires paperwork or you would like a letter written to your employer, please let us know. We will complete disability type forms at no charge. Please allow five business days for    forms to be completed.

## 2023-12-19 NOTE — PROGRESS NOTES
[facility] [place] 6-week postop note    Violetta Chavez returns in follow-up of radiofrequency ablation of her right great saphenous vein and medically necessary phlebectomies on 10/24/2023.  Her recovery has been somewhat difficult.  She has had dysesthesias along the distal medial right thigh and proximal medial calf which have been quite troubling.  Light touch was quite uncomfortable for her in the beginning and now when the area is bumped or touched, there is significant tenderness/pain.  This is a separate discomfort from the posteromedial tightness that she feels when she stands to walk or stretches.    Exam  No residual varicose veins.  There is mild ecchymosis along the distal third of the medial thigh along the course of the phlebectomies.  The phlebectomy sites themselves are healing satisfactorily.    The patient pointed out telangiectasias that appeared to have developed since surgery.  These are along the distal medial right thigh and proximal anteromedial calf.    We reviewed her preprocedure photos and noted large varicose veins in this area.  Certainly, there is much improvement in this regard.  I did not appreciate significant telangiectasias in the areas, however.  These likely are secondary to the phlebectomies.    Assessment  Overall healing satisfactorily but unfortunately she had a neuralgia of her right lower extremity which has been quite troubling for her and making her somewhat hesitant to pursue treatment of symptomatic left leg varicose veins, the primary reason for her presentation.  She has veins on the distal posterolateral left leg in addition to left great saphenous vein incompetence and varicosities on the medial left calf.  She is quite concerned about neuralgia.  With this in mind, she may be better served by cyanoacrylate glue ablation of the left great saphenous vein, minimizing the risk of neuralgia and simply allowing the varicosities on the left medial calf to shrink  from the ablation and then perform sclerotherapy of them at a later date.  The sizable varicosities on the lateral left distal leg extending onto the lateral ankle are too large for sclerotherapy and originate from a incompetent , though the  is small.  These could be treated with sclerotherapy but, given their size, she would likely have a poor cosmetic result with hyperpigmentation.  I think she would be best served with phlebectomy of these varicosities.    Finally, she stated that she had more discomfort with the procedure than she expected and would request more sedation this time.  I noted this in orders for the procedure.    Plan  We will submit for approval for VenaSeal closure of the left great saphenous vein with medically necessary phlebectomies of the distal posterolateral leg and ankle varicosities.  Sclerotherapy of the medial leg varicosities could be performed at a later date if necessary.    TEJA Marie MD, FACS    Dictated using Dragon voice recognition software which may result in transcription errors

## 2023-12-19 NOTE — LETTER
12/19/2023         RE: Violetta Chavez  [address] [place] [address]        Dear Colleague,    Thank you for referring your patient, Violetta Chavez, to the [facility] [place]. Please see a copy of my visit note below.    [facility] 6-week postop note    Violetta Chavez returns in follow-up of radiofrequency ablation of her right great saphenous vein and medically necessary phlebectomies on 10/24/2023.  Her recovery has been somewhat difficult.  She has had dysesthesias along the distal medial right thigh and proximal medial calf which have been quite troubling.  Light touch was quite uncomfortable for her in the beginning and now when the area is bumped or touched, there is significant tenderness/pain.  This is a separate discomfort from the posteromedial tightness that she feels when she stands to walk or stretches.    Exam  No residual varicose veins.  There is mild ecchymosis along the distal third of the medial thigh along the course of the phlebectomies.  The phlebectomy sites themselves are healing satisfactorily.    The patient pointed out telangiectasias that appeared to have developed since surgery.  These are along the distal medial right thigh and proximal anteromedial calf.    We reviewed her preprocedure photos and noted large varicose veins in this area.  Certainly, there is much improvement in this regard.  I did not appreciate significant telangiectasias in the areas, however.  These likely are secondary to the phlebectomies.    Assessment  Overall healing satisfactorily but unfortunately she had a neuralgia of her right lower extremity which has been quite troubling for her and making her somewhat hesitant to pursue treatment of symptomatic left leg varicose veins, the primary reason for her presentation.  She has veins on the distal posterolateral left leg in addition to left great saphenous vein incompetence and varicosities on the medial left  calf.  She is quite concerned about neuralgia.  With this in mind, she may be better served by cyanoacrylate glue ablation of the left great saphenous vein, minimizing the risk of neuralgia and simply allowing the varicosities on the left medial calf to shrink from the ablation and then perform sclerotherapy of them at a later date.  The sizable varicosities on the lateral left distal leg extending onto the lateral ankle are too large for sclerotherapy and originate from a incompetent , though the  is small.  These could be treated with sclerotherapy but, given their size, she would likely have a poor cosmetic result with hyperpigmentation.  I think she would be best served with phlebectomy of these varicosities.    Finally, she stated that she had more discomfort with the procedure than she expected and would request more sedation this time.  I noted this in orders for the procedure.    Plan  We will submit for approval for VenaSeal closure of the left great saphenous vein with medically necessary phlebectomies of the distal posterolateral leg and ankle varicosities.  Sclerotherapy of the medial leg varicosities could be performed at a later date if necessary.    TEJA Marie MD, FACS    Dictated using Dragon voice recognition software which may result in transcription errors      Again, thank you for allowing me to participate in the care of your patient.        Sincerely,        Denilson Marie MD

## 2024-01-04 ENCOUNTER — MEDICAL CORRESPONDENCE (OUTPATIENT)
Dept: HEALTH INFORMATION MANAGEMENT | Facility: CLINIC | Age: 69
End: 2024-01-04
Payer: COMMERCIAL

## 2024-01-05 ENCOUNTER — TELEPHONE (OUTPATIENT)
Dept: VASCULAR SURGERY | Facility: CLINIC | Age: 69
End: 2024-01-05

## 2024-01-05 NOTE — TELEPHONE ENCOUNTER
Jeronimom for patient to call back to schedule a consult with Dr. Marie. Dr. Faust, [facility] from [facility], referred patient to be seen for varicose veins of both lower extremities.

## 2024-12-22 ENCOUNTER — HEALTH MAINTENANCE LETTER (OUTPATIENT)
Age: 69
End: 2024-12-22